# Patient Record
Sex: FEMALE | Race: WHITE | Employment: UNEMPLOYED | URBAN - METROPOLITAN AREA
[De-identification: names, ages, dates, MRNs, and addresses within clinical notes are randomized per-mention and may not be internally consistent; named-entity substitution may affect disease eponyms.]

---

## 2017-02-24 ENCOUNTER — OFFICE VISIT (OUTPATIENT)
Dept: ENT CLINIC | Age: 77
End: 2017-02-24

## 2017-02-24 ENCOUNTER — TELEPHONE (OUTPATIENT)
Dept: ENT CLINIC | Age: 77
End: 2017-02-24

## 2017-02-24 VITALS
HEIGHT: 64 IN | HEART RATE: 82 BPM | WEIGHT: 141.8 LBS | SYSTOLIC BLOOD PRESSURE: 133 MMHG | DIASTOLIC BLOOD PRESSURE: 82 MMHG | BODY MASS INDEX: 24.21 KG/M2

## 2017-02-24 DIAGNOSIS — H60.332 ACUTE SWIMMER'S EAR OF LEFT SIDE: ICD-10-CM

## 2017-02-24 DIAGNOSIS — H61.22 IMPACTED CERUMEN OF LEFT EAR: Primary | ICD-10-CM

## 2017-02-24 PROCEDURE — 69210 REMOVE IMPACTED EAR WAX UNI: CPT | Performed by: OTOLARYNGOLOGY

## 2017-02-24 PROCEDURE — 1036F TOBACCO NON-USER: CPT | Performed by: OTOLARYNGOLOGY

## 2017-02-24 RX ORDER — NEOMYCIN SULFATE, POLYMYXIN B SULFATE AND HYDROCORTISONE 10; 3.5; 1 MG/ML; MG/ML; [USP'U]/ML
4 SUSPENSION/ DROPS AURICULAR (OTIC) 3 TIMES DAILY
Qty: 1 BOTTLE | Refills: 0 | Status: SHIPPED | OUTPATIENT
Start: 2017-02-24 | End: 2017-03-06

## 2017-09-22 ENCOUNTER — OFFICE VISIT (OUTPATIENT)
Dept: ENT CLINIC | Age: 77
End: 2017-09-22

## 2017-09-22 VITALS
BODY MASS INDEX: 24.29 KG/M2 | WEIGHT: 137.1 LBS | SYSTOLIC BLOOD PRESSURE: 134 MMHG | DIASTOLIC BLOOD PRESSURE: 88 MMHG | HEART RATE: 67 BPM | HEIGHT: 63 IN

## 2017-09-22 DIAGNOSIS — H61.23 IMPACTED CERUMEN OF BOTH EARS: Primary | ICD-10-CM

## 2017-09-22 PROCEDURE — 1036F TOBACCO NON-USER: CPT | Performed by: OTOLARYNGOLOGY

## 2017-09-22 PROCEDURE — 69210 REMOVE IMPACTED EAR WAX UNI: CPT | Performed by: OTOLARYNGOLOGY

## 2018-08-15 ENCOUNTER — OFFICE VISIT (OUTPATIENT)
Dept: CARDIOLOGY CLINIC | Age: 78
End: 2018-08-15

## 2018-08-15 VITALS
SYSTOLIC BLOOD PRESSURE: 138 MMHG | HEIGHT: 64 IN | DIASTOLIC BLOOD PRESSURE: 82 MMHG | HEART RATE: 61 BPM | WEIGHT: 136 LBS | BODY MASS INDEX: 23.22 KG/M2

## 2018-08-15 DIAGNOSIS — I45.6 WPW (WOLFF-PARKINSON-WHITE SYNDROME): Primary | ICD-10-CM

## 2018-08-15 PROCEDURE — 1090F PRES/ABSN URINE INCON ASSESS: CPT | Performed by: NURSE PRACTITIONER

## 2018-08-15 PROCEDURE — G8399 PT W/DXA RESULTS DOCUMENT: HCPCS | Performed by: NURSE PRACTITIONER

## 2018-08-15 PROCEDURE — 4040F PNEUMOC VAC/ADMIN/RCVD: CPT | Performed by: NURSE PRACTITIONER

## 2018-08-15 PROCEDURE — 93000 ELECTROCARDIOGRAM COMPLETE: CPT | Performed by: NURSE PRACTITIONER

## 2018-08-15 PROCEDURE — 1123F ACP DISCUSS/DSCN MKR DOCD: CPT | Performed by: NURSE PRACTITIONER

## 2018-08-15 PROCEDURE — 1101F PT FALLS ASSESS-DOCD LE1/YR: CPT | Performed by: NURSE PRACTITIONER

## 2018-08-15 PROCEDURE — G8420 CALC BMI NORM PARAMETERS: HCPCS | Performed by: NURSE PRACTITIONER

## 2018-08-15 PROCEDURE — 99214 OFFICE O/P EST MOD 30 MIN: CPT | Performed by: NURSE PRACTITIONER

## 2018-08-15 PROCEDURE — 1036F TOBACCO NON-USER: CPT | Performed by: NURSE PRACTITIONER

## 2018-08-15 PROCEDURE — G8427 DOCREV CUR MEDS BY ELIG CLIN: HCPCS | Performed by: NURSE PRACTITIONER

## 2018-08-15 RX ORDER — ROSUVASTATIN CALCIUM 5 MG/1
2.5 TABLET, COATED ORAL
COMMUNITY
Start: 2018-07-13 | End: 2021-01-13

## 2018-08-15 RX ORDER — OMEGA-3S/DHA/EPA/FISH OIL/D3 300MG-1000
400 CAPSULE ORAL DAILY
COMMUNITY

## 2018-08-15 NOTE — PROGRESS NOTES
patient's clinical course and testing results. All questions and concerns were addressed to the patient/family. Alternatives to my treatment were discussed. I have discussed the above stated plan and the patient verbalized understanding and agreed with the plan. NOTE: This report was transcribed using voice recognition software. Every effort was made to ensure accuracy, however, inadvertent computerized transcription errors may be present.        HENRRY Mendez  [place]    Office: [phone]

## 2018-08-15 NOTE — PATIENT INSTRUCTIONS
1) 30 day event monitor patch    2) Follow up w/Dr. Sharyn Ellis in 6 weeks    3) Please call the office with any questions, concerns, or changes [phone]

## 2018-08-16 ENCOUNTER — TELEPHONE (OUTPATIENT)
Dept: CARDIOLOGY CLINIC | Age: 78
End: 2018-08-16

## 2018-08-16 NOTE — TELEPHONE ENCOUNTER
BRIAN.  Patient returned mcot monitor today and said she decided she did not want to wear it.   wilber

## 2020-05-13 ENCOUNTER — OFFICE VISIT (OUTPATIENT)
Dept: ENT CLINIC | Age: 80
End: 2020-05-13
Payer: MEDICARE

## 2020-05-13 VITALS
TEMPERATURE: 97.5 F | WEIGHT: 135 LBS | HEIGHT: 64 IN | BODY MASS INDEX: 23.05 KG/M2 | HEART RATE: 73 BPM | DIASTOLIC BLOOD PRESSURE: 95 MMHG | SYSTOLIC BLOOD PRESSURE: 170 MMHG

## 2020-05-13 PROBLEM — H66.002 ACUTE SUPPURATIVE OTITIS MEDIA OF LEFT EAR WITHOUT SPONTANEOUS RUPTURE OF TYMPANIC MEMBRANE: Status: ACTIVE | Noted: 2020-05-13

## 2020-05-13 PROCEDURE — 1090F PRES/ABSN URINE INCON ASSESS: CPT | Performed by: OTOLARYNGOLOGY

## 2020-05-13 PROCEDURE — G8420 CALC BMI NORM PARAMETERS: HCPCS | Performed by: OTOLARYNGOLOGY

## 2020-05-13 PROCEDURE — G8399 PT W/DXA RESULTS DOCUMENT: HCPCS | Performed by: OTOLARYNGOLOGY

## 2020-05-13 PROCEDURE — 1123F ACP DISCUSS/DSCN MKR DOCD: CPT | Performed by: OTOLARYNGOLOGY

## 2020-05-13 PROCEDURE — 4040F PNEUMOC VAC/ADMIN/RCVD: CPT | Performed by: OTOLARYNGOLOGY

## 2020-05-13 PROCEDURE — G8427 DOCREV CUR MEDS BY ELIG CLIN: HCPCS | Performed by: OTOLARYNGOLOGY

## 2020-05-13 PROCEDURE — 99214 OFFICE O/P EST MOD 30 MIN: CPT | Performed by: OTOLARYNGOLOGY

## 2020-05-13 PROCEDURE — 4130F TOPICAL PREP RX AOE: CPT | Performed by: OTOLARYNGOLOGY

## 2020-05-13 PROCEDURE — 1036F TOBACCO NON-USER: CPT | Performed by: OTOLARYNGOLOGY

## 2020-05-13 RX ORDER — PREDNISOLONE ACETATE 10 MG/ML
SUSPENSION/ DROPS OPHTHALMIC
Qty: 1 BOTTLE | Refills: 0 | Status: SHIPPED | OUTPATIENT
Start: 2020-05-13 | End: 2021-01-13 | Stop reason: ALTCHOICE

## 2020-05-13 RX ORDER — ASPIRIN 81 MG/1
81 TABLET ORAL DAILY
COMMUNITY
End: 2020-06-12

## 2020-05-13 RX ORDER — AMOXICILLIN AND CLAVULANATE POTASSIUM 875; 125 MG/1; MG/1
1 TABLET, FILM COATED ORAL 2 TIMES DAILY
Qty: 20 TABLET | Refills: 0 | Status: SHIPPED | OUTPATIENT
Start: 2020-05-13 | End: 2020-05-23

## 2020-05-13 RX ORDER — CIPROFLOXACIN HYDROCHLORIDE 3.5 MG/ML
SOLUTION/ DROPS TOPICAL
Qty: 1 BOTTLE | Refills: 0 | Status: SHIPPED | OUTPATIENT
Start: 2020-05-13 | End: 2021-01-13 | Stop reason: ALTCHOICE

## 2020-05-13 NOTE — PROGRESS NOTES
[facility]         PCP:  Laisha Salinas MD      CHIEF COMPLAINT:  Chief Complaint   Patient presents with    Ear Problem       HISTORY OF PRESENT ILLNESS:   Judd Almendarez is a 78 y.o. female here for evaluation and treatment of a problem located in the left ear. The quality is a left ear infection. The severity is moderate. The duration is two to three weeks. The timing is constant. Associated symptoms are itching, drainage, and odor. \"For two weeks. I have a lot of wax and it has a lot of odor to it. I have Itching and odor, but no pain. I have no hearing in my left ear since the last surgery. A hearing aid doesn't help. I wear right hearing aid. \"        REVIEW OF SYSTEMS:   CONSTITUTIONAL:  Denied fever. Denied chills. Denied unexplained weight loss, over 20 pounds in the past six months. EARS, NOSE, THROAT:  (+) hearing loss, both ears, wear hearing aid in right ear, doesn't help left ear. (+) tinnitus, left ear only. Denied otorrhea, otalgia, rhinorrhea, sore throat and hoarseness. PAST MEDICAL HISTORY:  Past Medical History:   Diagnosis Date    Acute swimmer's ear of left side 2/24/2017    Chronic atticoantral suppurative otitis media of left ear 1/27/2014    Conductive hearing loss of both ears 2/19/2016    Essential hypertension     High cholesterol         Past Surgical History:   Procedure Laterality Date    GALLBLADDER SURGERY      MASTOIDECTOMY Left 12/04/09    TONSILLECTOMY      TUBAL LIGATION      TYMPANOPLASTY Left 12/04/09    tympanoplasty/mastoidectomy, Dr. Carreon Dollar:        Vitals:    05/13/20 1631 05/13/20 1640   BP: (!) 165/92 (!) 170/95   Pulse: 68 73   Temp: 97.5 °F (36.4 °C)    Weight: 135 lb (61.2 kg)    Height: 5' 3.5\" (1.613 m)       VITALS SIGNS were reviewed.     GENERAL APPEARANCE:  Well developed, well nourished, no apparent distress, no apparent deformities. ABILITY TO COMMUNICATE/QUALITY OF VOICE:  Communicated without difficulty. Normal voice. No hot potato voice. No hoarseness. EXTERNAL EAR/NOSE:  Normal pinnae and mastoids. Normal external nose. EARS, OTOMICROSCOPY:  The left EAC was mildly erythematous and edematous with purulent exudate, cerumen, and debris. This was removed with suction. The left TM was dull thick erythematous with retraction and possible cholesteatoma versus squamous debris in the anterior half of the TM. The right TM and EAC appeared to be normal.  Ciprodex was instilled into the left EAC and a cotton ball meatal plug placed. NOSE:  The nasal septum was midline. The inferior turbinates were normal.  The nasal mucosa and secretions were normal.  No pus or polyps were seen. SINUSES:  The maxillary and frontal sinuses were nontender, bilaterally. LIPS, TEETH, AND GUMS:   Normal.    OROPHARYNX/ORAL CAVITY:  Oral mucosa, hard and soft palates, tongue, and pharynx were normal.      NECK:  No masses or tenderness. The laryngeal cartilages and hyoid bone were normal, with normal laryngeal crepitus. No abnormal appearance, asymmetry or abnormal tracheal position. PALPATION OF LYMPH NODES, CERVICAL, FACIAL AND SUPRACLAVICULAR:  No lymphadenopathy. THYROID:  No nodules, enlargement, tenderness or masses. MOOD AND AFFECT: Normal.  No evidence of depression, anxiety or agitation. IMPRESSION / Ryania Lyla / Tommy No / PROCEDURES:       Kailee Wyatt was seen today for ear problem.     Diagnoses and all orders for this visit:    Acute swimmer's ear of left side  -     ciprofloxacin-dexamethasone (CIPRODEX) 0.3-0.1 % otic suspension; Place 4 drops into the left ear 2 times daily    Chronic atticoantral suppurative otitis media of left ear  -     ciprofloxacin-dexamethasone (CIPRODEX) 0.3-0.1 % otic suspension; Place 4 drops into the left ear 2 times daily    Acute suppurative otitis media of left ear without spontaneous rupture of tympanic membrane, recurrence not specified  -     amoxicillin-clavulanate (AUGMENTIN) 875-125 MG per tablet; Take 1 tablet by mouth 2 times daily for 10 days         RECOMMENDATIONS / PLAN:      Patient Instructions     WATER PRECAUTIONS  · Do not allow water to get into the left ear, as this may cause an ear infection. · Before bathing, showering or washing the hair, a plug of cotton coated with petroleum jelly should be placed in the opening of the ear canal.  After bathing the cotton should be removed and the outer part of the ear dried with a soft towel. Alternatively, you may use a silicone putty ear plug purchased from your pharmacy. · If water does enter the ear, drain the water out into a tissue or cotton ball. Then, instill into the ear four drops of the eardrops you were prescribed. Follow-up  Return in about 1 month (around 6/13/2020) for recheck/follow-up, and sooner if condition worsens.

## 2020-06-12 ENCOUNTER — OFFICE VISIT (OUTPATIENT)
Dept: ENT CLINIC | Age: 80
End: 2020-06-12
Payer: MEDICARE

## 2020-06-12 VITALS
DIASTOLIC BLOOD PRESSURE: 74 MMHG | TEMPERATURE: 97.3 F | SYSTOLIC BLOOD PRESSURE: 126 MMHG | HEIGHT: 64 IN | HEART RATE: 59 BPM | BODY MASS INDEX: 23.39 KG/M2 | WEIGHT: 137 LBS

## 2020-06-12 PROCEDURE — G8399 PT W/DXA RESULTS DOCUMENT: HCPCS | Performed by: OTOLARYNGOLOGY

## 2020-06-12 PROCEDURE — 4040F PNEUMOC VAC/ADMIN/RCVD: CPT | Performed by: OTOLARYNGOLOGY

## 2020-06-12 PROCEDURE — G8420 CALC BMI NORM PARAMETERS: HCPCS | Performed by: OTOLARYNGOLOGY

## 2020-06-12 PROCEDURE — G8427 DOCREV CUR MEDS BY ELIG CLIN: HCPCS | Performed by: OTOLARYNGOLOGY

## 2020-06-12 PROCEDURE — 1090F PRES/ABSN URINE INCON ASSESS: CPT | Performed by: OTOLARYNGOLOGY

## 2020-06-12 PROCEDURE — 1123F ACP DISCUSS/DSCN MKR DOCD: CPT | Performed by: OTOLARYNGOLOGY

## 2020-06-12 PROCEDURE — 4130F TOPICAL PREP RX AOE: CPT | Performed by: OTOLARYNGOLOGY

## 2020-06-12 PROCEDURE — 1036F TOBACCO NON-USER: CPT | Performed by: OTOLARYNGOLOGY

## 2020-06-12 PROCEDURE — 99212 OFFICE O/P EST SF 10 MIN: CPT | Performed by: OTOLARYNGOLOGY

## 2020-06-12 RX ORDER — ASPIRIN 325 MG
112.5 TABLET ORAL DAILY
COMMUNITY

## 2021-01-13 ENCOUNTER — OFFICE VISIT (OUTPATIENT)
Dept: ENT CLINIC | Age: 81
End: 2021-01-13
Payer: MEDICARE

## 2021-01-13 VITALS
OXYGEN SATURATION: 100 % | DIASTOLIC BLOOD PRESSURE: 79 MMHG | SYSTOLIC BLOOD PRESSURE: 169 MMHG | RESPIRATION RATE: 16 BRPM | HEIGHT: 64 IN | BODY MASS INDEX: 23.39 KG/M2 | TEMPERATURE: 96.9 F | HEART RATE: 60 BPM | WEIGHT: 137 LBS

## 2021-01-13 DIAGNOSIS — R42 DIZZINESS: Primary | ICD-10-CM

## 2021-01-13 PROCEDURE — G8427 DOCREV CUR MEDS BY ELIG CLIN: HCPCS | Performed by: OTOLARYNGOLOGY

## 2021-01-13 PROCEDURE — 4040F PNEUMOC VAC/ADMIN/RCVD: CPT | Performed by: OTOLARYNGOLOGY

## 2021-01-13 PROCEDURE — G8399 PT W/DXA RESULTS DOCUMENT: HCPCS | Performed by: OTOLARYNGOLOGY

## 2021-01-13 PROCEDURE — 1123F ACP DISCUSS/DSCN MKR DOCD: CPT | Performed by: OTOLARYNGOLOGY

## 2021-01-13 PROCEDURE — 99214 OFFICE O/P EST MOD 30 MIN: CPT | Performed by: OTOLARYNGOLOGY

## 2021-01-13 PROCEDURE — 1090F PRES/ABSN URINE INCON ASSESS: CPT | Performed by: OTOLARYNGOLOGY

## 2021-01-13 PROCEDURE — G8420 CALC BMI NORM PARAMETERS: HCPCS | Performed by: OTOLARYNGOLOGY

## 2021-01-13 PROCEDURE — G8484 FLU IMMUNIZE NO ADMIN: HCPCS | Performed by: OTOLARYNGOLOGY

## 2021-01-13 PROCEDURE — 1036F TOBACCO NON-USER: CPT | Performed by: OTOLARYNGOLOGY

## 2021-01-13 RX ORDER — MECLIZINE HYDROCHLORIDE 25 MG/1
25 TABLET ORAL 3 TIMES DAILY PRN
Qty: 40 TABLET | Refills: 1 | Status: SHIPPED | OUTPATIENT
Start: 2021-01-13

## 2021-01-13 ASSESSMENT — ENCOUNTER SYMPTOMS
SINUS PAIN: 0
VOICE CHANGE: 0
RHINORRHEA: 0
SORE THROAT: 0

## 2021-01-13 NOTE — PROGRESS NOTES
[facility]       ESTABLISHED PATIENT VISIT      PCP:  Brittni Braun MD      REFERRED BY:   self      CHIEF COMPLAINT:  Chief Complaint   Patient presents with    Dizziness       HISTORY OF PRESENT ILLNESS:       Mahin Mckeon is a [de-identified] y.o. female here for evaluation and treatment of dizziness, of mild severity, occurring intermittently, for about 1 week. She denied any associated symptoms of ear pain or otorrhea. Dizziness was described as a sensation of \"like my head's clogged up. It feels like motion sickness, with nausea. She had vomiting once at the initial onset. I don't feel  the room or world spinning. No other precipitating factors have been noted. REVIEW OF SYSTEMS:    Review of Systems   Constitutional: Negative for chills, fever and unexpected weight change. HENT: Positive for hearing loss (chronic both ears, left ear is \"dead\"), postnasal drip and tinnitus (chronic many years. ). Negative for congestion, ear discharge, ear pain, rhinorrhea, sinus pain, sore throat and voice change.              PAST MEDICAL HISTORY:    Past Medical History:   Diagnosis Date    Acute swimmer's ear of left side 2/24/2017    Chronic atticoantral suppurative otitis media of left ear 1/27/2014    Conductive hearing loss of both ears 2/19/2016    Essential hypertension     High cholesterol          Past Surgical History:   Procedure Laterality Date    GALLBLADDER SURGERY      MASTOIDECTOMY Left 12/04/09    TONSILLECTOMY      TUBAL LIGATION      TYMPANOPLASTY Left 12/04/09    tympanoplasty/mastoidectomy, Dr. Joshua Izquierdo:      Vitals:    01/13/21 1046   BP: (!) 169/79   Site: Right Upper Arm   Position: Sitting   Cuff Size: Medium Adult   Pulse: 60   Resp: 16   Temp: 96.9 °F (36.1 °C)   TempSrc: Tympanic   SpO2: 100%   Weight: 137 lb (62.1 kg)   Height: 5' 4\" (1.626 m)         Physical Exam  Vitals signs reviewed. Constitutional:       General: She is not in acute distress. Appearance: Normal appearance. She is normal weight. She is not ill-appearing. HENT:      Head: Normocephalic and atraumatic. Right Ear: Tympanic membrane, ear canal and external ear normal.      Left Ear: Ear canal and external ear normal.      Ears:      Comments: Left TM nonerythematous, dull, thick, with post surgical changes. Nose: Nose normal. No congestion or rhinorrhea. Mouth/Throat:      Mouth: Mucous membranes are moist.      Pharynx: Oropharynx is clear. No oropharyngeal exudate or posterior oropharyngeal erythema. Neck:      Musculoskeletal: Normal range of motion. No neck rigidity or muscular tenderness. Lymphadenopathy:      Cervical: No cervical adenopathy. Neurological:      Mental Status: She is alert. IMPRESSION / Steve Rock / Mavis Chol:       Nohelia Matias was seen today for dizziness. Diagnoses and all orders for this visit:    Dizziness  Comments:  probably acute labyrinthitis  Orders:  -     meclizine (ANTIVERT) 25 MG tablet; Take 1 tablet by mouth 3 times daily as needed for Dizziness           RECOMMENDATIONS/PLAN:      Return if dizziness symptoms worsen or fail to clear up in 5 weeks, or, for any further ENT or sinus problems or symptoms. Patient Instructions   1. Avoid working at [place], on ladders or scaffolding or near the edges of platforms or using heavy or complicated machinery or operating a motorized vehicle, or any activity where loss of consciousness would be hazardous to yourself or others, if you are experiencing dizziness, and until having been dizzy free for 72 hours. Even then, take appropriate care and precautions as dizziness could occur at any time. 2. Avoid any motions or activity that results in the off balance sensation. Stand up or arise slowly and in stages, as we discussed.            MEDICAL DECISION MAKING    # and complexity of problems addressed:  43635 - Moderate  1 undiagnosed NEW problem with uncertain prognosis    Amount and/or Complexity of Data to be Reviewed and Analyzed  12214 - Straightforward  no data or only 1 test or document reviewed or ordered    Risk of Complications and /or Morbidity or Mortality of Patient Management  08640 - Moderate  Prescription drug management